# Patient Record
Sex: FEMALE
[De-identification: names, ages, dates, MRNs, and addresses within clinical notes are randomized per-mention and may not be internally consistent; named-entity substitution may affect disease eponyms.]

---

## 2018-03-30 ENCOUNTER — HOSPITAL ENCOUNTER (OUTPATIENT)
Dept: HOSPITAL 89 - OR | Age: 73
Discharge: HOME | End: 2018-03-30
Attending: ORTHOPAEDIC SURGERY
Payer: MEDICARE

## 2018-03-30 VITALS — HEIGHT: 67 IN | WEIGHT: 163 LBS | BODY MASS INDEX: 25.58 KG/M2

## 2018-03-30 VITALS — DIASTOLIC BLOOD PRESSURE: 92 MMHG | SYSTOLIC BLOOD PRESSURE: 144 MMHG

## 2018-03-30 VITALS — SYSTOLIC BLOOD PRESSURE: 156 MMHG | DIASTOLIC BLOOD PRESSURE: 97 MMHG

## 2018-03-30 VITALS — SYSTOLIC BLOOD PRESSURE: 177 MMHG | DIASTOLIC BLOOD PRESSURE: 106 MMHG

## 2018-03-30 VITALS — DIASTOLIC BLOOD PRESSURE: 90 MMHG | SYSTOLIC BLOOD PRESSURE: 135 MMHG

## 2018-03-30 VITALS — DIASTOLIC BLOOD PRESSURE: 111 MMHG | SYSTOLIC BLOOD PRESSURE: 150 MMHG

## 2018-03-30 VITALS — SYSTOLIC BLOOD PRESSURE: 139 MMHG | DIASTOLIC BLOOD PRESSURE: 97 MMHG

## 2018-03-30 DIAGNOSIS — S52.122G: Primary | ICD-10-CM

## 2018-03-30 PROCEDURE — 24366 RECONSTRUCT HEAD OF RADIUS: CPT

## 2018-03-30 PROCEDURE — 24149 RADICAL RESECTION OF ELBOW: CPT

## 2018-03-30 NOTE — OPERATIVE REPORT 1
EVENT DATE:  March 30, 2018

SURGEON:  Kishore Ponce MD

ANESTHESIOLOGIST:  Bret Olmstead MD

ANESTHESIA:  General.

ASSISTANT:  Gabino Reese PA-C





PREOPERATIVE DIAGNOSIS  

Delayed treatment of left elbow fracture dislocation with retained incarcerated 
fragment of radial head involving approximately 50% of the radial head still 
stuck in the capsule with significant stiffness.  



POSTOPERATIVE DIAGNOSIS 

Delayed treatment of left elbow fracture dislocation with retained incarcerated 
fragment of radial head involving approximately 50% of the radial head still 
stuck in the capsule with significant stiffness.



PROCEDURES PERFORMED 

1.  Removal of radial head fragment incarcerated within the anterior capsule. 

2.  Columnar procedure with complete anterior capsulectomy.

3.  Radial head arthroplasty.  

4.  Manipulation into extension.



ESTIMATED BLOOD LOSS 

Minimal.



INTRAVENOUS FLUIDS 

1200 mL 



TOURNIQUET TIME

65 minutes



SPECIMENS

No specimens.



COMPLICATIONS

No complications.



IMPLANTS USED

[employer] Align radial head arthroplasty with a 7 mm stem, a zero 
extension, and 22 mm radial head.  



SUMMARY OF PROCEDURE 

The patient was brought into the operating room and placed on the OR table in 
the supine position with the hand table at her left side.  After obtaining 
adequate general anesthesia, the left upper extremity was prepped and draped in 
the usual sterile fashion.  The limb was exsanguinated, and the tourniquet was 
inflated to 250 mmHg.  In palpating her, it was evident that she had some 
fairly dense scar tissue with calcification just proximal to the lateral 
epicondyle which was quite sharp.  Therefore, I elected to make a small fascial 
incision using here and remove this since it would probably cause her pain, but 
the primary incision was made over the lateral epicondyle extending down over 
the radial head and taken down through capsule.  Care was taken to leave her 
pronated during the approach to minimize risk of damage to the posterior 
interosseous nerve.  Once we had exposed the radial head, we noted that the 
fluid was clear without any evidence of infection.  Subsequently, the broken 
radial head was excised with an oscillating saw after having first marked the 
position of her cut using the 15 mm hammer guide.  With this done, we then went 
about finding the incarcerated fragment of the radial which was quite large and 
was densely bound to the capsule, so we had to sharply dissect it away from this
, removed it, and then measured the head.  It looked like it was about a 23, so 
we elected to downsize to 22 since we had a choice between a 22 and a 24.  At 
this point, I did try to extend her elbow, but unfortunately, she was still 
heavily scarred down and would not extend.  Therefore, we tried to do a release 
of the capsule from the anterior aspect of the humerus.  This was done 
carefully in a subperiosteal plane all the way down to the joint, and then we 
tested again, but unfortunately, it still would not extend.  Therefore, I ended 
up doing a wedge resection capsulectomy of this tissue, and then afterward, we 
were able to extend her well.  We now directed our attention to the radial head 
arthroplasty itself, which started with a 7 reamer which seemed like it had 
good contact.  The C-arm was used to check position, and it was touching the 
cortical walls.  Consequently, we trialed with a stem, and the 24 looked too 
large.  The 22 looked like it would fit well, but I did have to cut a little 
bit more off of the neck to get an adequate fit.  Otherwise, it looked like it 
was a bit overstuffed.  With this repeat cut and repeat reaming, the trial was 
done again, and it looked much better.  We then removed the trial, washed the 
wound, and then placed our final implant.  The alignment guide was used to the 
reference point previously assessed at the fovea of the ulna, and we locked it 
into position using the torque wrench.  With this done, the wound was irrigated 
one more time, and then we deflated the tourniquet.  We controlled bleeding 
with bipolar cautery.  There were no major vessels seen.  Repeat irrigation was 
followed by closure with 0 Vicryl on the fascia and then closure with 3-0 in 
the subcutaneous tissue and 4-0 Monocryl for skin with Steri-Strips.  Repeat 
irrigation was done between layers of closure.  She was given a posterior 
splint and a sling.  



PLAN

The plan was to have her begin physical therapy in approximately seven days 
after her initial physician visit.   
SHAE